# Patient Record
Sex: FEMALE | URBAN - METROPOLITAN AREA
[De-identification: names, ages, dates, MRNs, and addresses within clinical notes are randomized per-mention and may not be internally consistent; named-entity substitution may affect disease eponyms.]

---

## 2020-06-18 ENCOUNTER — TELEPHONE ENCOUNTER (OUTPATIENT)
Dept: URBAN - METROPOLITAN AREA CLINIC 82 | Facility: CLINIC | Age: 20
End: 2020-06-18

## 2020-06-25 ENCOUNTER — TELEPHONE ENCOUNTER (OUTPATIENT)
Dept: URBAN - METROPOLITAN AREA CLINIC 115 | Facility: CLINIC | Age: 20
End: 2020-06-25

## 2020-07-14 ENCOUNTER — TELEPHONE ENCOUNTER (OUTPATIENT)
Dept: URBAN - METROPOLITAN AREA CLINIC 115 | Facility: CLINIC | Age: 20
End: 2020-07-14

## 2020-07-14 RX ORDER — SODIUM, POTASSIUM,MAG SULFATES 17.5-3.13G
354 ML SOLUTION, RECONSTITUTED, ORAL ORAL ONCE
Qty: 354 ML | Refills: 0 | OUTPATIENT
Start: 2020-07-15 | End: 2020-07-16

## 2020-10-05 ENCOUNTER — OFFICE VISIT (OUTPATIENT)
Dept: URBAN - METROPOLITAN AREA CLINIC 82 | Facility: CLINIC | Age: 20
End: 2020-10-05
Payer: COMMERCIAL

## 2020-10-05 DIAGNOSIS — R10.9 ABDOMINAL PAIN: ICD-10-CM

## 2020-10-05 DIAGNOSIS — K21.9 GERD: ICD-10-CM

## 2020-10-05 DIAGNOSIS — R14.0 BLOATING: ICD-10-CM

## 2020-10-05 DIAGNOSIS — K58.9 IBS (IRRITABLE BOWEL SYNDROME): ICD-10-CM

## 2020-10-05 PROCEDURE — 99214 OFFICE O/P EST MOD 30 MIN: CPT | Performed by: INTERNAL MEDICINE

## 2020-10-05 PROCEDURE — G9903 PT SCRN TBCO ID AS NON USER: HCPCS | Performed by: INTERNAL MEDICINE

## 2020-10-05 PROCEDURE — G8484 FLU IMMUNIZE NO ADMIN: HCPCS | Performed by: INTERNAL MEDICINE

## 2020-10-05 PROCEDURE — G8427 DOCREV CUR MEDS BY ELIG CLIN: HCPCS | Performed by: INTERNAL MEDICINE

## 2020-10-05 PROCEDURE — G8420 CALC BMI NORM PARAMETERS: HCPCS | Performed by: INTERNAL MEDICINE

## 2020-10-05 RX ORDER — PLECANATIDE 3 MG/1
1 TABLET TABLET ORAL ONCE A DAY
Status: ACTIVE | COMMUNITY

## 2020-10-05 RX ORDER — TEGASEROD 6 MG/1
TAKE 1 TABLET (6 MG) BY ORAL ROUTE 2 TIMES PER DAY FOR 30 DAYS TABLET ORAL TWICE A DAY
Qty: 180 TABLET | Refills: 3
Start: 2020-03-05

## 2020-10-05 RX ORDER — PANTOPRAZOLE SODIUM 40 MG/1
TAKE 1 TABLET BY MOUTH TWICE DAILY TABLET, DELAYED RELEASE ORAL
Qty: 60 | Refills: 0 | Status: DISCONTINUED | COMMUNITY
Start: 2020-04-09

## 2020-10-05 RX ORDER — HYOSCYAMINE SULFATE 0.12 MG/1
TAKE 1 TABLET (0.125 MG) BY ORAL ROUTE EVERY 8 HOURS AS NEEDED FOR CRAMPING TABLET ORAL
Qty: 60 | Refills: 0 | Status: DISCONTINUED | COMMUNITY
Start: 2019-11-17

## 2020-10-05 RX ORDER — ESOMEPRAZOLE MAGNESIUM 40 MG/1
TAKE 1 CAPSULE (40 MG) BY ORAL ROUTE ONCE DAILY FOR 90 DAYS CAPSULE, DELAYED RELEASE PELLETS ORAL 1
Qty: 90 CAPSULE | Refills: 1

## 2020-10-05 RX ORDER — NORETHINDRONE ACETATE AND ETHINYL ESTRADIOL 1.5-30(21)
TAKE 1 TABLET BY ORAL ROUTE ONCE DAILY KIT ORAL 1
Qty: 0 | Refills: 0 | Status: DISCONTINUED | COMMUNITY
Start: 1900-01-01

## 2020-10-05 RX ORDER — BUSPIRONE HYDROCHLORIDE 15 MG/1
1 TABLET TABLET ORAL TWICE A DAY
Status: ACTIVE | COMMUNITY

## 2020-10-05 RX ORDER — NORETHINDRONE ACETATE AND ETHINYL ESTRADIOL AND FERROUS FUMARATE 1MG-20(21)
1 TABLET KIT ORAL ONCE A DAY
Status: ACTIVE | COMMUNITY

## 2020-10-05 RX ORDER — PLECANATIDE 3 MG/1
1 TABLET TABLET ORAL ONCE A DAY
OUTPATIENT

## 2020-10-05 RX ORDER — TEGASEROD 6 MG/1
TAKE 1 TABLET (6 MG) BY ORAL ROUTE 2 TIMES PER DAY FOR 30 DAYS TABLET ORAL 2
Qty: 60 | Refills: 11 | Status: DISCONTINUED | COMMUNITY
Start: 2020-03-05 | End: 2021-02-28

## 2020-10-05 RX ORDER — ESOMEPRAZOLE MAGNESIUM 40 MG/1
TAKE 1 CAPSULE (40 MG) BY ORAL ROUTE ONCE DAILY FOR 90 DAYS CAPSULE, DELAYED RELEASE PELLETS ORAL 1
Qty: 90 | Refills: 3 | Status: ACTIVE | COMMUNITY
Start: 2020-03-05 | End: 2021-02-28

## 2020-10-05 NOTE — HPI-TODAY'S VISIT:
21 yo F here follow up for IBS- C . trulance has stopped working and she is using Mg citrate every other day. Anxiety meds are working better. She is taking daily esomeprazole and her PCP has just started back on esomeprazole.  Insurance did not approve despite sending info for zelnorm.

## 2021-04-01 ENCOUNTER — DASHBOARD ENCOUNTERS (OUTPATIENT)
Age: 21
End: 2021-04-01

## 2021-04-05 ENCOUNTER — OFFICE VISIT (OUTPATIENT)
Dept: URBAN - METROPOLITAN AREA CLINIC 82 | Facility: CLINIC | Age: 21
End: 2021-04-05
Payer: COMMERCIAL

## 2021-04-05 ENCOUNTER — LAB OUTSIDE AN ENCOUNTER (OUTPATIENT)
Dept: URBAN - METROPOLITAN AREA CLINIC 82 | Facility: CLINIC | Age: 21
End: 2021-04-05

## 2021-04-05 VITALS
SYSTOLIC BLOOD PRESSURE: 115 MMHG | TEMPERATURE: 97.5 F | HEART RATE: 76 BPM | HEIGHT: 65 IN | WEIGHT: 141.2 LBS | DIASTOLIC BLOOD PRESSURE: 71 MMHG | BODY MASS INDEX: 23.53 KG/M2

## 2021-04-05 DIAGNOSIS — K59.01 CONSTIPATION BY DELAYED COLONIC TRANSIT: ICD-10-CM

## 2021-04-05 DIAGNOSIS — R14.0 BLOATING: ICD-10-CM

## 2021-04-05 DIAGNOSIS — K58.9 IBS (IRRITABLE BOWEL SYNDROME): ICD-10-CM

## 2021-04-05 DIAGNOSIS — K21.9 GERD: ICD-10-CM

## 2021-04-05 DIAGNOSIS — R10.9 ABDOMINAL PAIN: ICD-10-CM

## 2021-04-05 PROBLEM — 35298007: Status: ACTIVE | Noted: 2021-04-05

## 2021-04-05 PROCEDURE — 99214 OFFICE O/P EST MOD 30 MIN: CPT | Performed by: INTERNAL MEDICINE

## 2021-04-05 RX ORDER — LACTULOSE 10 G/15ML
15 ML SOLUTION ORAL
Qty: 450 MILLILITER | Refills: 3 | OUTPATIENT
Start: 2021-04-05 | End: 2021-08-03

## 2021-04-05 RX ORDER — TEGASEROD 6 MG/1
TAKE 1 TABLET (6 MG) BY ORAL ROUTE 2 TIMES PER DAY FOR 30 DAYS TABLET ORAL TWICE A DAY
Qty: 180 TABLET | Refills: 3 | COMMUNITY
Start: 2020-03-05

## 2021-04-05 RX ORDER — ESOMEPRAZOLE MAGNESIUM 40 MG/1
TAKE 1 CAPSULE (40 MG) BY ORAL ROUTE ONCE DAILY FOR 90 DAYS CAPSULE, DELAYED RELEASE PELLETS ORAL 1
Qty: 90 CAPSULE | Refills: 1 | COMMUNITY

## 2021-04-05 RX ORDER — NORETHINDRONE ACETATE AND ETHINYL ESTRADIOL AND FERROUS FUMARATE 1MG-20(21)
1 TABLET KIT ORAL ONCE A DAY
COMMUNITY

## 2021-04-05 RX ORDER — PLECANATIDE 3 MG/1
1 TABLET TABLET ORAL ONCE A DAY
Qty: 30 TABLET | Refills: 5 | OUTPATIENT
Start: 2021-04-05 | End: 2021-10-02

## 2021-04-05 RX ORDER — BUSPIRONE HYDROCHLORIDE 15 MG/1
1 TABLET TABLET ORAL TWICE A DAY
COMMUNITY

## 2021-04-05 NOTE — HPI-TODAY'S VISIT:
19 yo F here follow up for IBS- C . trulance has stopped working and she is using Mg citrate every other day. Anxiety meds are working better. She is taking daily esomeprazole and her PCP has just started back on esomeprazole.  Insurance did not approve despite sending info for zelnorm. Patient stated she is continues to have constipation abdominal bloating and distention.  Zelnorm was not helping she tried to go back on Trulance with only partial relief.  She is taking magnesium however she can continues to have significant abdominal distention.  Anxiety is not under control and she admits that symptoms worse when she is anxious.  In the past she has tried Amitiza and Linzess.  Prior colonoscopy in 2019 was unremarkable.  Denies any unintentional weight loss denies rectal bleeding.

## 2021-08-06 ENCOUNTER — TELEPHONE ENCOUNTER (OUTPATIENT)
Dept: URBAN - METROPOLITAN AREA CLINIC 78 | Facility: CLINIC | Age: 21
End: 2021-08-06

## 2021-08-06 RX ORDER — ESOMEPRAZOLE MAGNESIUM 40 MG/1
TAKE ONE CAPSULE BY MOUTH EVERY DAY CAPSULE, DELAYED RELEASE ORAL
Qty: 90 CAPSULE | Refills: 1

## 2021-08-09 ENCOUNTER — ERX REFILL RESPONSE (OUTPATIENT)
Dept: URBAN - METROPOLITAN AREA CLINIC 82 | Facility: CLINIC | Age: 21
End: 2021-08-09

## 2021-08-09 RX ORDER — LACTULOSE 10 G/15ML
TAKE 15 ML BY MOUTH EVERY DAY AS NEEDED FOR CONSTIPATION SOLUTION ORAL
Qty: 450 MILLILITER | Refills: 1 | OUTPATIENT

## 2021-08-09 RX ORDER — ESOMEPRAZOLE MAGNESIUM 40 MG/1
TAKE 1 CAPSULE (40 MG) BY ORAL ROUTE ONCE DAILY FOR 90 DAYS CAPSULE, DELAYED RELEASE PELLETS ORAL 1
Qty: 90 CAPSULE | Refills: 1 | OUTPATIENT

## 2021-08-09 RX ORDER — LACTULOSE 10 G/15ML
TAKE 15 ML BY MOUTH EVERY DAY AS NEEDED FOR CONSTIPATION SOLUTION ORAL
Qty: 450 MILLILITER | Refills: 0 | OUTPATIENT

## 2021-08-09 RX ORDER — ESOMEPRAZOLE MAGNESIUM 40 MG/1
TAKE ONE CAPSULE BY MOUTH EVERY DAY CAPSULE, DELAYED RELEASE ORAL
Qty: 90 CAPSULE | Refills: 1 | OUTPATIENT

## 2021-12-09 ENCOUNTER — TELEPHONE ENCOUNTER (OUTPATIENT)
Dept: URBAN - METROPOLITAN AREA CLINIC 82 | Facility: CLINIC | Age: 21
End: 2021-12-09

## 2021-12-09 RX ORDER — PLECANATIDE 3 MG/1
1 TABLET TABLET ORAL ONCE A DAY
Qty: 90 | Refills: 0
End: 2022-03-09

## 2022-03-14 ENCOUNTER — TELEPHONE ENCOUNTER (OUTPATIENT)
Dept: URBAN - METROPOLITAN AREA CLINIC 115 | Facility: CLINIC | Age: 22
End: 2022-03-14

## 2022-03-14 RX ORDER — PLECANATIDE 3 MG/1
1 TABLET TABLET ORAL ONCE A DAY
Qty: 90 TABLET | Refills: 1